# Patient Record
Sex: MALE | Race: WHITE | NOT HISPANIC OR LATINO | Employment: FULL TIME | URBAN - METROPOLITAN AREA
[De-identification: names, ages, dates, MRNs, and addresses within clinical notes are randomized per-mention and may not be internally consistent; named-entity substitution may affect disease eponyms.]

---

## 2020-06-26 ENCOUNTER — APPOINTMENT (OUTPATIENT)
Dept: GENERAL RADIOLOGY | Facility: HOSPITAL | Age: 45
End: 2020-06-26

## 2020-06-26 ENCOUNTER — HOSPITAL ENCOUNTER (EMERGENCY)
Facility: HOSPITAL | Age: 45
Discharge: HOME | End: 2020-06-26
Admitting: EMERGENCY MEDICINE

## 2020-06-26 VITALS
DIASTOLIC BLOOD PRESSURE: 74 MMHG | TEMPERATURE: 97.9 F | RESPIRATION RATE: 16 BRPM | SYSTOLIC BLOOD PRESSURE: 118 MMHG | WEIGHT: 193.34 LBS | OXYGEN SATURATION: 98 % | BODY MASS INDEX: 24.81 KG/M2 | HEART RATE: 76 BPM | HEIGHT: 74 IN

## 2020-06-26 DIAGNOSIS — S61.317A LACERATION OF LEFT LITTLE FINGER WITHOUT FOREIGN BODY WITH DAMAGE TO NAIL, INITIAL ENCOUNTER: Primary | ICD-10-CM

## 2020-06-26 PROCEDURE — 90471 IMMUNIZATION ADMIN: CPT | Performed by: PHYSICIAN ASSISTANT

## 2020-06-26 PROCEDURE — 25010000002 TDAP 5-2.5-18.5 LF-MCG/0.5 SUSPENSION: Performed by: PHYSICIAN ASSISTANT

## 2020-06-26 PROCEDURE — 99283 EMERGENCY DEPT VISIT LOW MDM: CPT

## 2020-06-26 PROCEDURE — 90715 TDAP VACCINE 7 YRS/> IM: CPT | Performed by: PHYSICIAN ASSISTANT

## 2020-06-26 PROCEDURE — 73140 X-RAY EXAM OF FINGER(S): CPT

## 2020-06-26 RX ORDER — CEPHALEXIN 500 MG/1
500 CAPSULE ORAL 3 TIMES DAILY
Qty: 21 CAPSULE | Refills: 0 | Status: SHIPPED | OUTPATIENT
Start: 2020-06-26 | End: 2020-12-16

## 2020-06-26 RX ADMIN — TETANUS TOXOID, REDUCED DIPHTHERIA TOXOID AND ACELLULAR PERTUSSIS VACCINE, ADSORBED 0.5 ML: 5; 2.5; 8; 8; 2.5 SUSPENSION INTRAMUSCULAR at 17:36

## 2020-06-26 NOTE — ED PROVIDER NOTES
Subjective   History:  45-year-old male who presents to the ER with left fifth finger injury.  He was using a saw when he cut his finger.  He reports that he at baseline he has a moderate loss of sensation in his left finger compared to right from a previous injury.  He believes he has had a recent tetanus but reports he is unsure of the dates.    Onset: 1 day  Location: Left fifth finger  Duration: Constant   character: Injury with bleeding and dull pain  Aggravating/Alleviating factors: None   radiation none  Severity: Moderate            Review of Systems   Constitutional: Negative for chills, diaphoresis, fatigue and fever.   HENT: Negative.    Respiratory: Negative for cough, choking and shortness of breath.    Cardiovascular: Negative for chest pain.   Gastrointestinal: Negative for abdominal distention, abdominal pain, nausea and vomiting.   Genitourinary: Negative.    Musculoskeletal:        Left 5th finger pain   Skin: Positive for wound.   Neurological: Negative.    Psychiatric/Behavioral: Negative.        No past medical history on file.    No Known Allergies    No past surgical history on file.    No family history on file.    Social History     Socioeconomic History   • Marital status:      Spouse name: Not on file   • Number of children: Not on file   • Years of education: Not on file   • Highest education level: Not on file           Objective   Physical Exam   Constitutional: He is oriented to person, place, and time. He appears well-developed and well-nourished.   HENT:   Head: Normocephalic and atraumatic.   Eyes: Pupils are equal, round, and reactive to light.   Neck: Normal range of motion.   Pulmonary/Chest: Effort normal.   Musculoskeletal: Normal range of motion.   Neurological: He is alert and oriented to person, place, and time.   Skin: Skin is warm and dry.   Left 5th finger avulsion injury. Tip still intact. Caprefill still normal. Nail removed from nail bed. Loss of sensation when  compared to right (normal for patient from previous injury)    Psychiatric: He has a normal mood and affect. His behavior is normal.       Laceration Repair  Date/Time: 6/26/2020 6:34 PM  Performed by: Skylar Davis PA-C  Authorized by: Skylar Davis PA-C     Consent:     Consent obtained:  Verbal    Consent given by:  Patient    Risks discussed:  Infection, pain and poor wound healing  Anesthesia (see MAR for exact dosages):     Anesthesia method:  Local infiltration    Local anesthetic:  Lidocaine 2% w/o epi and bupivacaine 0.5% w/o epi  Laceration details:     Location:  Finger    Finger location:  L small finger    Length (cm):  0.5  Repair type:     Repair type:  Intermediate  Pre-procedure details:     Preparation:  Patient was prepped and draped in usual sterile fashion and imaging obtained to evaluate for foreign bodies  Exploration:     Hemostasis achieved with:  Direct pressure and tourniquet    Wound exploration: wound explored through full range of motion and entire depth of wound probed and visualized      Contaminated: no    Skin repair:     Repair method:  Sutures    Suture size:  5-0    Suture material:  Chromic gut    Suture technique:  Simple interrupted    Number of sutures:  3  Approximation:     Approximation:  Close  Post-procedure details:     Dressing:  Antibiotic ointment, bulky dressing and splint for protection    Patient tolerance of procedure:  Tolerated well, no immediate complications  Comments:      Also placed to nylon simple interrupted running sutures through nail once nail was re-secured to finger.               ED Course      Xr Finger 2+ View Left    Result Date: 6/26/2020  No acute fracture or dislocation. There is no radiopaque foreign body.  Electronically Signed By-Branden Sam On:6/26/2020 5:40 PM This report was finalized on [id number] by  Branden Sam, .    Labs Reviewed - No data to display  Medications   Tdap (BOOSTRIX) injection 0.5 mL (0.5 mL Intramuscular Given  6/26/20 1736)                                          MDM  Number of Diagnoses or Management Options  Laceration of left little finger without foreign body with damage to nail, initial encounter:   Diagnosis management comments: I examined the patient using the appropriate personal protective equipment.      DISPOSITION:   Chart Review:  Comorbidity:  has no past medical history on file.  Differentials:this list is not all inclusive and does not constitute the entirety of considered causes --> finger laceration, fracture    Imaging: Was interpreted by physician and reviewed by myself:  Xr Finger 2+ View Left    Result Date: 6/26/2020  No acute fracture or dislocation. There is no radiopaque foreign body.  Electronically Signed By-Branden Sam On:6/26/2020 5:40 PM This report was finalized on [id number] by  Branden Sam, .      Disposition/Treatment:  45-year-old male who presents to the ER with a finger laceration with nailbed injury this was repaired in the ER.  He was given follow-up instructions along with Keflex.  Return precautions were provided he was stable at time of discharge agreement plan.      Patient Progress  Patient progress: stable      Final diagnoses:   Laceration of left little finger without foreign body with damage to nail, initial encounter            Skylar Davis PA-C  06/26/20 1553

## 2020-06-26 NOTE — ED NOTES
Pt reports he cut his finger on a saw today and has a lac on his right pinky finger. Pt states he doesn't have much feeling in that finger from a past injury. Pt reports no new numbness and is able to move his finger. Pt reports pain is a 1/10.        Debbie Miguel, RN  06/26/20 0888

## 2020-06-26 NOTE — DISCHARGE INSTRUCTIONS
Return to the ER for any worsening symptoms.  Follow-up with Dr. Quigley or [facility] for any further management.  Remove sutures in 2 weeks.  Otherwise keep clean dry and covered.  Wash twice daily with antibacterial soap.  Initiate course of antibiotics.

## 2020-12-16 ENCOUNTER — HOSPITAL ENCOUNTER (EMERGENCY)
Facility: HOSPITAL | Age: 45
Discharge: HOME | End: 2020-12-16
Attending: EMERGENCY MEDICINE | Admitting: EMERGENCY MEDICINE

## 2020-12-16 VITALS
BODY MASS INDEX: 26.27 KG/M2 | TEMPERATURE: 97.6 F | WEIGHT: 198.19 LBS | SYSTOLIC BLOOD PRESSURE: 118 MMHG | HEART RATE: 79 BPM | RESPIRATION RATE: 16 BRPM | HEIGHT: 73 IN | OXYGEN SATURATION: 100 % | DIASTOLIC BLOOD PRESSURE: 79 MMHG

## 2020-12-16 DIAGNOSIS — K05.20 ACUTE PERICORONITIS: ICD-10-CM

## 2020-12-16 DIAGNOSIS — K02.9 DENTAL DECAY: ICD-10-CM

## 2020-12-16 DIAGNOSIS — R68.84 JAW PAIN: Primary | ICD-10-CM

## 2020-12-16 PROCEDURE — 99283 EMERGENCY DEPT VISIT LOW MDM: CPT

## 2020-12-16 PROCEDURE — 25010000002 CEFTRIAXONE PER 250 MG: Performed by: EMERGENCY MEDICINE

## 2020-12-16 PROCEDURE — 96372 THER/PROPH/DIAG INJ SC/IM: CPT

## 2020-12-16 RX ORDER — PENICILLIN V POTASSIUM 500 MG/1
500 TABLET ORAL 4 TIMES DAILY
Qty: 28 TABLET | Refills: 0 | Status: SHIPPED | OUTPATIENT
Start: 2020-12-16

## 2020-12-16 RX ORDER — HYDROCODONE BITARTRATE AND ACETAMINOPHEN 5; 325 MG/1; MG/1
1 TABLET ORAL EVERY 6 HOURS PRN
Qty: 12 TABLET | Refills: 0 | Status: SHIPPED | OUTPATIENT
Start: 2020-12-16

## 2020-12-16 RX ORDER — HYDROCODONE BITARTRATE AND ACETAMINOPHEN 5; 325 MG/1; MG/1
1 TABLET ORAL ONCE AS NEEDED
Status: COMPLETED | OUTPATIENT
Start: 2020-12-16 | End: 2020-12-16

## 2020-12-16 RX ADMIN — LIDOCAINE HYDROCHLORIDE 1 G: 10 INJECTION, SOLUTION EPIDURAL; INFILTRATION; INTRACAUDAL; PERINEURAL at 04:33

## 2020-12-16 RX ADMIN — HYDROCODONE BITARTRATE AND ACETAMINOPHEN 1 TABLET: 5; 325 TABLET ORAL at 04:33

## 2020-12-16 NOTE — ED PROVIDER NOTES
Subjective   45-year-old male complaining of dental pain he reports that is worse on the bottom jaw on the left in the back he reports that he has had no definite fever or chills but woke up feeling hot this morning states his face feels a little swollen.  He denies neck pain or stiffness.  He states he has not seen a dentist in a long time          Review of Systems   HENT: Negative for nosebleeds, sore throat and trouble swallowing.    Musculoskeletal: Negative for neck pain and neck stiffness.   All other systems reviewed and are negative.      History reviewed. No pertinent past medical history.    No Known Allergies    History reviewed. No pertinent surgical history.    History reviewed. No pertinent family history.    Social History     Socioeconomic History   • Marital status:      Spouse name: Not on file   • Number of children: Not on file   • Years of education: Not on file   • Highest education level: Not on file   Tobacco Use   • Smoking status: Current Every Day Smoker     Packs/day: 1.00     Types: Cigarettes   Substance and Sexual Activity   • Alcohol use: Yes     Comment: occasional   • Drug use: Never           Objective   Physical Exam  Nontoxic Donna Coma Scale 15  HEENT: Pupils equal reactive extract movements intact there is some swelling noted of the face along the angle of the mandible on the left.  The patient has area of erosion and dental decay to the gumline with pericoronitis changes in tooth #17.  There is tender submandibular lymphadenopathy neck itself is supple  Procedures           ED Course                                           MDM  Number of Diagnoses or Management Options  Risk of Complications, Morbidity, and/or Mortality  Presenting problems: high  Diagnostic procedures: high  Management options: high  General comments: The patient was given a prescription for penicillin VK and hydrocodone.  Inspect was reviewed by pharmacy.  The patient was given a list of dental  contact numbers the patient was stable at discharge and vocalized understanding of discharge instructions and warnings        Final diagnoses:   Jaw pain   Acute pericoronitis   Dental decay            Leon Teran MD  12/16/20 0425

## 2020-12-16 NOTE — DISCHARGE INSTRUCTIONS
Rest, medication as directed today  You can also use Tylenol or ibuprofen for discomfort  Follow-up with a dentist is essential

## 2022-12-01 ENCOUNTER — HOSPITAL ENCOUNTER (EMERGENCY)
Facility: HOSPITAL | Age: 47
Discharge: HOME | End: 2022-12-01
Admitting: EMERGENCY MEDICINE

## 2022-12-01 ENCOUNTER — APPOINTMENT (OUTPATIENT)
Dept: CT IMAGING | Facility: HOSPITAL | Age: 47
End: 2022-12-01

## 2022-12-01 ENCOUNTER — APPOINTMENT (OUTPATIENT)
Dept: GENERAL RADIOLOGY | Facility: HOSPITAL | Age: 47
End: 2022-12-01

## 2022-12-01 VITALS
WEIGHT: 200 LBS | OXYGEN SATURATION: 97 % | BODY MASS INDEX: 25.67 KG/M2 | TEMPERATURE: 98.1 F | HEART RATE: 72 BPM | RESPIRATION RATE: 18 BRPM | DIASTOLIC BLOOD PRESSURE: 64 MMHG | HEIGHT: 74 IN | SYSTOLIC BLOOD PRESSURE: 111 MMHG

## 2022-12-01 DIAGNOSIS — J38.4 LARYNX EDEMA: Primary | ICD-10-CM

## 2022-12-01 DIAGNOSIS — J02.9 SORE THROAT: ICD-10-CM

## 2022-12-01 LAB
B PARAPERT DNA SPEC QL NAA+PROBE: NOT DETECTED
B PERT DNA SPEC QL NAA+PROBE: NOT DETECTED
C PNEUM DNA NPH QL NAA+NON-PROBE: NOT DETECTED
FLUAV SUBTYP SPEC NAA+PROBE: NOT DETECTED
FLUBV RNA ISLT QL NAA+PROBE: NOT DETECTED
HADV DNA SPEC NAA+PROBE: NOT DETECTED
HCOV 229E RNA SPEC QL NAA+PROBE: NOT DETECTED
HCOV HKU1 RNA SPEC QL NAA+PROBE: NOT DETECTED
HCOV NL63 RNA SPEC QL NAA+PROBE: NOT DETECTED
HCOV OC43 RNA SPEC QL NAA+PROBE: NOT DETECTED
HMPV RNA NPH QL NAA+NON-PROBE: NOT DETECTED
HPIV1 RNA ISLT QL NAA+PROBE: NOT DETECTED
HPIV2 RNA SPEC QL NAA+PROBE: NOT DETECTED
HPIV3 RNA NPH QL NAA+PROBE: NOT DETECTED
HPIV4 P GENE NPH QL NAA+PROBE: NOT DETECTED
M PNEUMO IGG SER IA-ACNC: NOT DETECTED
RHINOVIRUS RNA SPEC NAA+PROBE: NOT DETECTED
RSV RNA NPH QL NAA+NON-PROBE: NOT DETECTED
S PYO AG THROAT QL: NEGATIVE
SARS-COV-2 RNA NPH QL NAA+NON-PROBE: NOT DETECTED

## 2022-12-01 PROCEDURE — 87651 STREP A DNA AMP PROBE: CPT

## 2022-12-01 PROCEDURE — 99283 EMERGENCY DEPT VISIT LOW MDM: CPT

## 2022-12-01 PROCEDURE — 0 IOPAMIDOL PER 1 ML

## 2022-12-01 PROCEDURE — 96374 THER/PROPH/DIAG INJ IV PUSH: CPT

## 2022-12-01 PROCEDURE — 70491 CT SOFT TISSUE NECK W/DYE: CPT

## 2022-12-01 PROCEDURE — 0202U NFCT DS 22 TRGT SARS-COV-2: CPT

## 2022-12-01 PROCEDURE — 70360 X-RAY EXAM OF NECK: CPT

## 2022-12-01 PROCEDURE — 25010000002 DEXAMETHASONE PER 1 MG: Performed by: EMERGENCY MEDICINE

## 2022-12-01 RX ORDER — PREDNISONE 20 MG/1
20 TABLET ORAL DAILY
Qty: 7 TABLET | Refills: 0 | Status: SHIPPED | OUTPATIENT
Start: 2022-12-01

## 2022-12-01 RX ORDER — DEXAMETHASONE SODIUM PHOSPHATE 4 MG/ML
6 INJECTION, SOLUTION INTRA-ARTICULAR; INTRALESIONAL; INTRAMUSCULAR; INTRAVENOUS; SOFT TISSUE ONCE
Status: DISCONTINUED | OUTPATIENT
Start: 2022-12-01 | End: 2022-12-01

## 2022-12-01 RX ORDER — DEXAMETHASONE SODIUM PHOSPHATE 4 MG/ML
6 INJECTION, SOLUTION INTRA-ARTICULAR; INTRALESIONAL; INTRAMUSCULAR; INTRAVENOUS; SOFT TISSUE ONCE
Status: COMPLETED | OUTPATIENT
Start: 2022-12-01 | End: 2022-12-01

## 2022-12-01 RX ORDER — CLINDAMYCIN HYDROCHLORIDE 300 MG/1
300 CAPSULE ORAL 4 TIMES DAILY
Qty: 28 CAPSULE | Refills: 0 | Status: SHIPPED | OUTPATIENT
Start: 2022-12-01

## 2022-12-01 RX ADMIN — IOPAMIDOL 100 ML: 755 INJECTION, SOLUTION INTRAVENOUS at 09:41

## 2022-12-01 RX ADMIN — DEXAMETHASONE SODIUM PHOSPHATE 6 MG: 4 INJECTION, SOLUTION INTRAMUSCULAR; INTRAVENOUS at 09:59

## 2022-12-01 NOTE — ED PROVIDER NOTES
Subjective   History of Present Illness  Chief Complaint: Sore throat      HPI: Patient is a 47-year-old male presents to the ER today complaining of sore throat with postnasal drainage, he feels that he has a change in his voice this morning.  Pain is worse in his throat with swallowing, also complains of a headache is not the worst of his life was not sudden onset or thunderclap sensation.  States he awoke with symptoms this morning.  He reports recent exposures to flu.  He has a history of reflux and takes Pepcid daily.  He does not have a primary care that he sees regularly.  He did not attempt treatment prior to arrival.  He maintains oral secretions without difficulty.    PCP: None    History provided by:  Patient      Review of Systems   Constitutional: Negative for fever.   HENT: Positive for postnasal drip and sore throat. Negative for ear pain, sinus pressure and sinus pain.    Respiratory: Negative.    Cardiovascular: Negative.    Gastrointestinal: Negative.    Genitourinary: Negative.    Musculoskeletal: Negative.    Skin: Negative.    Neurological: Positive for headaches.   Hematological: Negative.    Psychiatric/Behavioral: Negative.        No past medical history on file.    No Known Allergies    No past surgical history on file.    No family history on file.    Social History     Socioeconomic History   • Marital status:    Tobacco Use   • Smoking status: Every Day     Packs/day: 1.00     Types: Cigarettes   Substance and Sexual Activity   • Alcohol use: Yes     Comment: occasional   • Drug use: Never           Objective   Physical Exam  Vitals reviewed.   Constitutional:       Appearance: He is not toxic-appearing.   HENT:      Head: Normocephalic.      Right Ear: Tympanic membrane and ear canal normal. No swelling or tenderness.      Left Ear: No swelling or tenderness.      Ears:      Comments: Serous middle ear effusion noted to the left without drainage, no surrounding erythema      Mouth/Throat:      Mouth: Mucous membranes are moist. No oral lesions.      Pharynx: Uvula midline. Posterior oropharyngeal erythema present. No oropharyngeal exudate or uvula swelling.   Cardiovascular:      Rate and Rhythm: Normal rate and regular rhythm.      Pulses: Normal pulses.      Heart sounds: Normal heart sounds. No murmur heard.  Pulmonary:      Effort: Pulmonary effort is normal.      Breath sounds: Normal breath sounds. No wheezing.   Abdominal:      General: Bowel sounds are normal.      Palpations: Abdomen is soft.      Tenderness: There is no abdominal tenderness.   Musculoskeletal:         General: Normal range of motion.      Cervical back: Normal range of motion.   Skin:     General: Skin is warm.      Capillary Refill: Capillary refill takes less than 2 seconds.   Neurological:      General: No focal deficit present.      Mental Status: He is alert and oriented to person, place, and time. Mental status is at baseline.      Motor: No weakness.   Psychiatric:         Mood and Affect: Mood is anxious.         Procedures           ED Course  ED Course as of 12/01/22 1553   u Dec 01, 2022   0822 Awaiting respiratory panel results [BH]   0912 Minimal subglottic narrowing noted on x-ray, per Dr. Chiu CT soft tissue, added as well as intramuscular Decadron. [BH]   1053 Review of CT scan with Dr. Chiu, call placed to [facility] for consultation. [BH]   1109 Awaiting return call from [facility] [BH]   1156 Continue to await [facility] call [BH]   1226 Continue to await return call from [facility]   [BH]   1246 Spoke with Deneen with gastroenterology related to CT, advised that this would be an ENT consultation [BH]   1307 I was placed on hold [facility] for approaching 25 minutes, I left my return phone number. [BH]   1427 Have never received a return call from an [facility] as well as [facility].  [facility] has now been consulted. [BH]   1737 Spoke with Celeste LINCOLN with [facility], we discussed CT  "findings, she advised that she would speak to Dr. Majano and return my call.  []   1504 Spoke again with Celeste LINCOLN with [facility] who advised beginning the patient on clindamycin as well as prednisone 20 mg once daily for 7 days and follow-up outpatient.  Patient has been resting throughout his emergency room stay O2 sats have remained above 95% on room air.  Airway has been patent without difficulty breathing and maintains oral secretions without difficulty. []      ED Course User Index  [] Alejandrina Teran, APRN      /69   Pulse 70   Temp 97.8 °F (36.6 °C) (Oral)   Resp 18   Ht 188 cm (74\")   Wt 90.7 kg (200 lb)   SpO2 97%   BMI 25.68 kg/m²   Labs Reviewed   RESPIRATORY PANEL PCR W/ COVID-19 (SARS-COV-2) HANNAH/ELENI/MOO/PAD/COR/MAD/[facility]ZA IN-HOUSE, NP SWAB IN UTM/VTP, 3-4 HR TAT - Normal    Narrative:     In the setting of a positive respiratory panel with a viral infection PLUS a negative procalcitonin without other underlying concern for bacterial infection, consider observing off antibiotics or discontinuation of antibiotics and continue supportive care. If the respiratory panel is positive for atypical bacterial infection (Bordetella pertussis, Chlamydophila pneumoniae, or Mycoplasma pneumoniae), consider antibiotic de-escalation to target atypical bacterial infection.   RAPID STREP A SCREEN - Normal     Medications   dexamethasone (DECADRON) injection 6 mg (6 mg Intravenous Given 12/1/22 0959)   iopamidol (ISOVUE-370) 76 % injection 100 mL (100 mL Intravenous Given 12/1/22 0941)     XR Neck Soft Tissue    Result Date: 12/1/2022  Minimal subglottic narrowing of the trachea is questioned which could represent underlying inflammation.  Otherwise unremarkable soft tissues of neck radiographs  Electronically Signed By-Dennis Choi On:12/1/2022 8:58 AM This report was finalized on [id number] by  Dennis Choi, .    CT Soft Tissue Neck With Contrast    Result Date: 12/1/2022  1. " Generalized thickening and edema of the posterior subglottic and prevertebral soft tissues, extending to the posterior larynx, particularly involving the area epiglottic fold.. There is edema within the anterior prevertebral space. Mild thickening and inflammation of the lower cervical and upper thoracic esophagus. No drainable abscess collection is seen at this time. The airway is mildly narrowed but does remain patent.  Electronically Signed By-Ethel Leonard MD On:12/1/2022 10:12 AM This report was finalized on [id number] by  Ethel Leonard MD.                                         MDM  Number of Diagnoses or Management Options  Larynx edema  Sore throat  Diagnosis management comments: While in the emergency room patient was placed on appropriate monitoring as well as above evaluation.  Respiratory panel and strep were both negative, physical exam patient reports change in phonation, x-ray of soft tissue was added after discussion with Dr. Chiu.  CT soft tissue notes edema to the Larynx into the epiglottic fold, after significant delay of care and contacting a local ENT, spoke with Celeste LINCOLN with [facility] who advised beginning the patient on clindamycin as well as prednisone daily for 7 days, and following up outpatient in their office.  Patient was given a dose of Decadron in the emergency room as well as a first dose of clindamycin.  Strongly encouraged the patient to  antibiotics today, take as directed, as well as calling ENT for ASAP appointment.  We also discussed smoking cessation, and worrisome signs and symptoms to monitor for and when to return to the emergency room.  Patient gave verbal understanding of discharge instructions, he was alert oriented nontoxic in appearance, airway patent with O2 sats at 97% on room air, maintaining oral secretions without difficulty.  He denied further questions at time of discharge.    Patient did report some symptom improvement following the  Decadron, stating that he slept throughout a lot of his emergency room stay, he feels comfortable being discharged and following up outpatient. Strongly encouraged him to call to make follow up appointments today.     I spoke with the patient at the bedside regarding their plan of care, discharge instruction, home care, prescriptions, indications to return to the emergency department, and importance follow-up.  We discussed test results at the bedside, including incidental abnormal labs, radiological findings, understands need for follow-up with primary care or specialist if indicated.     Pt is aware that discharge does not mean that nothing is wrong but it indicates no emergency is present and they must continue care with follow-up as given below or physician of their choice    Radiology Studies:  Reviewed by myself, Read by Radiologist.  Chart review: No recent ER visits or pertinent today's complaints    Comorbidities: Smoker, GERD    Differentials: Strep, pharyngitis, viral illness, epiglottitis, allergic reaction not all inclusive of differentials considered    This document is intended for medical expert use only.  Reading of this document by patient and/or patient's family without participating medical staff guidance may result misinterpretation and unintended morbidity.  Any interpretation of such data is the responsibility of the patient and or family member responsible for the patient in concert with their primary specialist providers, not to be left for resources of online searches such as Web MD, Ad.IQ or similar core use.  Relying on these approaches to her knowledge may result in misinterpretation, misguided goals of care and even death should patient's or family history of recommendations outside of drama professional medical care in a supervised inpatient environment.    Appropriate PPE worn throughout the care of this patient.    Part of this note may be an electronic transcription/translation of  spoken language to printed text using the Dragon Dictation System.            Amount and/or Complexity of Data Reviewed  Clinical lab tests: reviewed  Tests in the radiology section of CPT®: reviewed    Risk of Complications, Morbidity, and/or Mortality  Presenting problems: high    Patient Progress  Patient progress: stable      Final diagnoses:   Larynx edema   Sore throat       ED Disposition  ED Disposition     ED Disposition   Discharge    Condition   Stable    Comment   --             [facility]  [address]  [facility] [id number] [phone]  Call today      [facility]  [facility] [address] [phone]  Schedule an appointment as soon as possible for a visit in 1 week  As needed, If symptoms worsen         Medication List      New Prescriptions    clindamycin 300 MG capsule  Commonly known as: CLEOCIN  Take 1 capsule by mouth 4 (Four) Times a Day.     predniSONE 20 MG tablet  Commonly known as: DELTASONE  Take 1 tablet by mouth Daily.           Where to Get Your Medications      These medications were sent to [id number] - [place], [place] - [address] [facility] [address] - [phone]  - [phone]   [address] [place] [address]    Phone: [phone]   · clindamycin 300 MG capsule  · predniSONE 20 MG tablet          Alejandrina Teran, APRN  12/01/22 1559

## 2022-12-01 NOTE — DISCHARGE INSTRUCTIONS
Take antibiotics as prescribed, until completion  Take prednisone daily, consider taking it in the morning as it can cause insomnia  Stop smoking    Call ENT today for follow-up    Follow-up with her primary care    Return to the ER for new or worsening symptoms

## 2023-08-09 ENCOUNTER — APPOINTMENT (OUTPATIENT)
Dept: GENERAL RADIOLOGY | Facility: HOSPITAL | Age: 48
End: 2023-08-09
Payer: COMMERCIAL

## 2023-08-09 ENCOUNTER — HOSPITAL ENCOUNTER (EMERGENCY)
Facility: HOSPITAL | Age: 48
Discharge: HOME | End: 2023-08-09
Attending: EMERGENCY MEDICINE | Admitting: EMERGENCY MEDICINE
Payer: COMMERCIAL

## 2023-08-09 ENCOUNTER — APPOINTMENT (OUTPATIENT)
Dept: CT IMAGING | Facility: HOSPITAL | Age: 48
End: 2023-08-09
Payer: COMMERCIAL

## 2023-08-09 VITALS
HEART RATE: 51 BPM | OXYGEN SATURATION: 98 % | TEMPERATURE: 97.8 F | BODY MASS INDEX: 26.69 KG/M2 | RESPIRATION RATE: 20 BRPM | SYSTOLIC BLOOD PRESSURE: 115 MMHG | WEIGHT: 208 LBS | DIASTOLIC BLOOD PRESSURE: 72 MMHG | HEIGHT: 74 IN

## 2023-08-09 DIAGNOSIS — R51.9 NONINTRACTABLE EPISODIC HEADACHE, UNSPECIFIED HEADACHE TYPE: Primary | ICD-10-CM

## 2023-08-09 DIAGNOSIS — R55 NEAR SYNCOPE: ICD-10-CM

## 2023-08-09 LAB
ALBUMIN SERPL-MCNC: 4.3 G/DL (ref 3.5–5.2)
ALBUMIN/GLOB SERPL: 1.7 G/DL
ALP SERPL-CCNC: 59 U/L (ref 39–117)
ALT SERPL W P-5'-P-CCNC: 14 U/L (ref 1–41)
ANION GAP SERPL CALCULATED.3IONS-SCNC: 9 MMOL/L (ref 5–15)
AST SERPL-CCNC: 18 U/L (ref 1–40)
BASOPHILS # BLD AUTO: 0.1 10*3/MM3 (ref 0–0.2)
BASOPHILS NFR BLD AUTO: 1.1 % (ref 0–1.5)
BILIRUB SERPL-MCNC: 0.8 MG/DL (ref 0–1.2)
BUN SERPL-MCNC: 14 MG/DL (ref 6–20)
BUN/CREAT SERPL: 13.5 (ref 7–25)
CALCIUM SPEC-SCNC: 9.3 MG/DL (ref 8.6–10.5)
CHLORIDE SERPL-SCNC: 106 MMOL/L (ref 98–107)
CO2 SERPL-SCNC: 26 MMOL/L (ref 22–29)
CREAT SERPL-MCNC: 1.04 MG/DL (ref 0.76–1.27)
DEPRECATED RDW RBC AUTO: 44.2 FL (ref 37–54)
EGFRCR SERPLBLD CKD-EPI 2021: 89.7 ML/MIN/1.73
EOSINOPHIL # BLD AUTO: 0.3 10*3/MM3 (ref 0–0.4)
EOSINOPHIL NFR BLD AUTO: 5.4 % (ref 0.3–6.2)
ERYTHROCYTE [DISTWIDTH] IN BLOOD BY AUTOMATED COUNT: 13.7 % (ref 12.3–15.4)
GLOBULIN UR ELPH-MCNC: 2.6 GM/DL
GLUCOSE SERPL-MCNC: 88 MG/DL (ref 65–99)
HCT VFR BLD AUTO: 47.9 % (ref 37.5–51)
HGB BLD-MCNC: 16 G/DL (ref 13–17.7)
LIPASE SERPL-CCNC: 39 U/L (ref 13–60)
LYMPHOCYTES # BLD AUTO: 1.5 10*3/MM3 (ref 0.7–3.1)
LYMPHOCYTES NFR BLD AUTO: 27.7 % (ref 19.6–45.3)
MCH RBC QN AUTO: 29.4 PG (ref 26.6–33)
MCHC RBC AUTO-ENTMCNC: 33.4 G/DL (ref 31.5–35.7)
MCV RBC AUTO: 88 FL (ref 79–97)
MONOCYTES # BLD AUTO: 0.4 10*3/MM3 (ref 0.1–0.9)
MONOCYTES NFR BLD AUTO: 6.7 % (ref 5–12)
NEUTROPHILS NFR BLD AUTO: 3.1 10*3/MM3 (ref 1.7–7)
NEUTROPHILS NFR BLD AUTO: 59.1 % (ref 42.7–76)
NRBC BLD AUTO-RTO: 0.1 /100 WBC (ref 0–0.2)
NT-PROBNP SERPL-MCNC: 59.8 PG/ML (ref 0–450)
PLATELET # BLD AUTO: 270 10*3/MM3 (ref 140–450)
PMV BLD AUTO: 7.4 FL (ref 6–12)
POTASSIUM SERPL-SCNC: 4.5 MMOL/L (ref 3.5–5.2)
PROT SERPL-MCNC: 6.9 G/DL (ref 6–8.5)
RBC # BLD AUTO: 5.44 10*6/MM3 (ref 4.14–5.8)
SODIUM SERPL-SCNC: 141 MMOL/L (ref 136–145)
TROPONIN T SERPL HS-MCNC: <6 NG/L
WBC NRBC COR # BLD: 5.3 10*3/MM3 (ref 3.4–10.8)

## 2023-08-09 PROCEDURE — 71045 X-RAY EXAM CHEST 1 VIEW: CPT

## 2023-08-09 PROCEDURE — 83690 ASSAY OF LIPASE: CPT | Performed by: EMERGENCY MEDICINE

## 2023-08-09 PROCEDURE — 83880 ASSAY OF NATRIURETIC PEPTIDE: CPT | Performed by: EMERGENCY MEDICINE

## 2023-08-09 PROCEDURE — 80053 COMPREHEN METABOLIC PANEL: CPT | Performed by: EMERGENCY MEDICINE

## 2023-08-09 PROCEDURE — 70496 CT ANGIOGRAPHY HEAD: CPT

## 2023-08-09 PROCEDURE — 85025 COMPLETE CBC W/AUTO DIFF WBC: CPT | Performed by: EMERGENCY MEDICINE

## 2023-08-09 PROCEDURE — 74177 CT ABD & PELVIS W/CONTRAST: CPT

## 2023-08-09 PROCEDURE — 25510000001 IOPAMIDOL PER 1 ML: Performed by: EMERGENCY MEDICINE

## 2023-08-09 PROCEDURE — 93005 ELECTROCARDIOGRAM TRACING: CPT | Performed by: EMERGENCY MEDICINE

## 2023-08-09 PROCEDURE — 70450 CT HEAD/BRAIN W/O DYE: CPT

## 2023-08-09 PROCEDURE — 99285 EMERGENCY DEPT VISIT HI MDM: CPT

## 2023-08-09 PROCEDURE — 70498 CT ANGIOGRAPHY NECK: CPT

## 2023-08-09 PROCEDURE — 84484 ASSAY OF TROPONIN QUANT: CPT | Performed by: EMERGENCY MEDICINE

## 2023-08-09 RX ORDER — SODIUM CHLORIDE 0.9 % (FLUSH) 0.9 %
10 SYRINGE (ML) INJECTION AS NEEDED
Status: DISCONTINUED | OUTPATIENT
Start: 2023-08-09 | End: 2023-08-09 | Stop reason: HOSPADM

## 2023-08-09 RX ADMIN — SODIUM CHLORIDE 1000 ML: 9 INJECTION, SOLUTION INTRAVENOUS at 14:50

## 2023-08-09 RX ADMIN — IOPAMIDOL 100 ML: 755 INJECTION, SOLUTION INTRAVENOUS at 12:29

## 2023-08-09 NOTE — ED PROVIDER NOTES
Subjective   History of Present Illness  48-year-old male denies any significant prior medical history presents for feelings of lightheadedness and like he is going to pass out.  States he has had a left-sided headache going on for probably a couple months.  States he has been intermittently lightheaded as well but states it was significantly worse today when he was at work.  Felt like he was going to pass out.  It is never been that bad before.  Headache worse whenever he goes to sit up as well.  Does not have a PCP.    States headaches not bad when he is laying down but when he goes to stand up and get significantly worse.  Denies any history of trauma even remotely.  No active car accidents, no falls.  States he hit his head on a bar at work but was having the headaches before that.  No fevers.    Review of Systems  Positive for lightheadedness, headache.  No past medical history on file.    No Known Allergies    No past surgical history on file.    No family history on file.    Social History     Socioeconomic History    Marital status:    Tobacco Use    Smoking status: Every Day     Packs/day: 1.00     Types: Cigarettes   Substance and Sexual Activity    Alcohol use: Yes     Comment: occasional    Drug use: Never           Objective   Physical Exam  Constitutional:  No acute distress.  Head:  Atraumatic.  Normocephalic.   Eyes:  No scleral icterus. Normal conjunctivae  ENT:  Moist mucosa.  No nasal discharge present.  Cardiovascular:  Well perfused.  Equal pulses.  Regular rhythm and normal rate.  Normal capillary refill.    Pulmonary/Chest:  No respiratory distress.  Airway patent.  No tachypnea.  No accessory muscle usage.    Abdominal:  Nondistended. Nontender.   Extremities:  No peripheral edema.  No Deformity  Skin:  Warm, dry  Neurological:  Alert and oriented to person place time and situation. PERRL.  EOMI.  Cranial nerves II-XII are intact.  Strength is equal and 5/5 in the upper and lower  extremities bilaterally.  No sensory deficits to light touch.  No pronator drift.  Normal speech.  Normal cerebellar function during finger-nose-finger and heel to shin.  Patient with abnormal gait.  Wanting to hold onto things.  States he does not feel like his balance is off or like the room is spinning but that he feels very lightheaded like he may pass out.      Procedures           ED Course                                           Medical Decision Making  Amount and/or Complexity of Data Reviewed  Labs: ordered.  Radiology: ordered.  ECG/medicine tests: ordered.    Risk  Prescription drug management.      EKG # 1  Signed and interpreted by the EP.  Time Interpreted: 1049  Rate: 54  Rhythm: Sinus bradycardia  Axis: Normal axis  Intervals: Normal intervals  ST Segments: No acute ischemic changes     Patient with reassuring neuroexam other than gait.  Gait seems to be steady but he has to stop to hold onto things because he stated he feels like he is going to pass out.  Not orthostatic.  Advised observation for further testing but patient unsure what he wants to do.  Will give fluids and reassess.    After fluids patiently states he feels markedly better.  Has normal gait now.  States his positional headache is now minimal.  Advised pushing fluids at home.  States he likes Pedialyte and will begin drinking some of this.  States his headaches that he has are usually in the afternoon.  He drinks an energy drink every morning.  Would wonder if he has caffeine rebound headaches.  Advised to try to cut back on the caffeine and see if this helps headache symptoms.  Reassuring neuroexam.  Imaging all negative.  Perform CTAs and CT abdomen pelvis as he states there is some type of hereditary finding causing aneurysms in his family.  CTAs all negative.  Will DC home.  Has PCP follow-up on 21st of this month.  Return ER precautions discussed    Final diagnoses:   Nonintractable episodic headache, unspecified headache type    Near syncope       ED Disposition  ED Disposition       ED Disposition   Discharge    Condition   Stable    Comment   --               Seipel, Joseph F, MD  [address] [address]  [phone]    In 1 week      PATIENT CONNECTION - [facility] [address]  [phone]  In 3 days           Medication List      No changes were made to your prescriptions during this visit.            Vladimir Downing MD  08/09/23 0298

## 2023-08-10 LAB — QT INTERVAL: 410 MS
